# Patient Record
Sex: MALE | URBAN - METROPOLITAN AREA
[De-identification: names, ages, dates, MRNs, and addresses within clinical notes are randomized per-mention and may not be internally consistent; named-entity substitution may affect disease eponyms.]

---

## 2023-05-03 ENCOUNTER — APPOINTMENT (OUTPATIENT)
Dept: URBAN - METROPOLITAN AREA CLINIC 273 | Age: 88
Setting detail: DERMATOLOGY
End: 2023-05-03

## 2023-05-03 PROBLEM — C44.319 BASAL CELL CARCINOMA OF SKIN OF OTHER PARTS OF FACE: Status: ACTIVE | Noted: 2023-05-03

## 2023-05-03 PROCEDURE — OTHER ADDITIONAL NOTES: OTHER

## 2023-05-03 PROCEDURE — OTHER MIPS QUALITY: OTHER

## 2023-05-03 PROCEDURE — OTHER CONSULTATION FOR MOHS SURGERY: OTHER

## 2023-05-03 PROCEDURE — 99203 OFFICE O/P NEW LOW 30 MIN: CPT

## 2023-05-03 NOTE — PROCEDURE: ADDITIONAL NOTES
Detail Level: Simple
Additional Notes: Previous biopsy by  on 4/6/2023
Render Risk Assessment In Note?: no

## 2023-05-03 NOTE — PROCEDURE: MIPS QUALITY
Quality 111:Pneumonia Vaccination Status For Older Adults: Patient received any pneumococcal conjugate or polysaccharide vaccine on or after their 60th birthday and before the end of the measurement period
Detail Level: Simple
Quality 402: Tobacco Use And Help With Quitting Among Adolescents: Patient screened for tobacco and is an ex-smoker
Quality 226: Preventive Care And Screening: Tobacco Use: Screening And Cessation Intervention: Patient screened for tobacco use and is an ex/non-smoker
Quality 431: Preventive Care And Screening: Unhealthy Alcohol Use - Screening: Patient not identified as an unhealthy alcohol user when screened for unhealthy alcohol use using a systematic screening method
Quality 110: Preventive Care And Screening: Influenza Immunization: Influenza Immunization previously received during influenza season

## 2023-05-03 NOTE — PROCEDURE: CONSULTATION FOR MOHS SURGERY
Detail Level: Simple
Incorporate Mauc In Note: Yes
X Size Of Lesion In Cm (Optional): 0
Body Location Override (Optional - Billing Will Still Be Based On Selected Body Map Location If Applicable): right periorbital

## 2023-05-16 ENCOUNTER — APPOINTMENT (OUTPATIENT)
Dept: URBAN - METROPOLITAN AREA CLINIC 306 | Age: 88
Setting detail: DERMATOLOGY
End: 2023-05-18

## 2023-05-16 PROBLEM — C44.1122 BASAL CELL CARCINOMA OF SKIN OF RIGHT LOWER EYELID, INCLUDING CANTHUS: Status: ACTIVE | Noted: 2023-05-16

## 2023-05-16 PROCEDURE — 13152 CMPLX RPR E/N/E/L 2.6-7.5 CM: CPT

## 2023-05-16 PROCEDURE — OTHER MOHS SURGERY: OTHER

## 2023-05-16 PROCEDURE — 17311 MOHS 1 STAGE H/N/HF/G: CPT

## 2023-11-21 ENCOUNTER — APPOINTMENT (OUTPATIENT)
Dept: URBAN - METROPOLITAN AREA CLINIC 306 | Age: 88
Setting detail: DERMATOLOGY
End: 2023-11-21

## 2023-11-21 PROBLEM — C44.02 SQUAMOUS CELL CARCINOMA OF SKIN OF LIP: Status: ACTIVE | Noted: 2023-11-21

## 2023-11-21 PROCEDURE — 17311 MOHS 1 STAGE H/N/HF/G: CPT

## 2023-11-21 PROCEDURE — 14060 TIS TRNFR E/N/E/L 10 SQ CM/<: CPT

## 2023-11-21 PROCEDURE — OTHER MOHS SURGERY: OTHER

## 2023-11-29 ENCOUNTER — APPOINTMENT (OUTPATIENT)
Dept: URBAN - METROPOLITAN AREA CLINIC 306 | Age: 88
Setting detail: DERMATOLOGY
End: 2023-11-29

## 2023-11-29 DIAGNOSIS — L57.0 ACTINIC KERATOSIS: ICD-10-CM

## 2023-11-29 PROBLEM — C44.02 SQUAMOUS CELL CARCINOMA OF SKIN OF LIP: Status: ACTIVE | Noted: 2023-11-29

## 2023-11-29 PROCEDURE — OTHER SUTURE REMOVAL (GLOBAL PERIOD): OTHER

## 2023-11-29 PROCEDURE — OTHER COUNSELING: OTHER

## 2023-11-29 PROCEDURE — 17000 DESTRUCT PREMALG LESION: CPT | Mod: 79

## 2023-11-29 PROCEDURE — 99024 POSTOP FOLLOW-UP VISIT: CPT

## 2023-11-29 PROCEDURE — OTHER LIQUID NITROGEN: OTHER

## 2023-11-29 ASSESSMENT — LOCATION DETAILED DESCRIPTION DERM: LOCATION DETAILED: RIGHT INFERIOR CENTRAL MALAR CHEEK

## 2023-11-29 ASSESSMENT — LOCATION ZONE DERM: LOCATION ZONE: FACE

## 2023-11-29 ASSESSMENT — LOCATION SIMPLE DESCRIPTION DERM: LOCATION SIMPLE: RIGHT CHEEK

## 2023-11-29 NOTE — PROCEDURE: LIQUID NITROGEN
Show Aperture Variable?: Yes
Consent: The patient's consent was obtained including but not limited to risks of crusting, scabbing, blistering, scarring, darker or lighter pigmentary change, recurrence, incomplete removal and infection.
Number Of Freeze-Thaw Cycles: 1 freeze-thaw cycle
Post-Care Instructions: I reviewed with the patient in detail post-care instructions. Patient is to wear sunprotection, and avoid picking at any of the treated lesions. Pt may apply Vaseline to crusted or scabbing areas.
Render Post-Care Instructions In Note?: no
Application Tool (Optional): Cotton Tipped Applicator
Duration Of Freeze Thaw-Cycle (Seconds): 10
Detail Level: Detailed

## 2023-11-29 NOTE — PROCEDURE: SUTURE REMOVAL (GLOBAL PERIOD)
Detail Level: Detailed
Add 96409 Cpt? (Important Note: In 2017 The Use Of 81099 Is Being Tracked By Cms To Determine Future Global Period Reimbursement For Global Periods): yes
